# Patient Record
Sex: MALE | Race: WHITE | NOT HISPANIC OR LATINO | URBAN - NONMETROPOLITAN AREA
[De-identification: names, ages, dates, MRNs, and addresses within clinical notes are randomized per-mention and may not be internally consistent; named-entity substitution may affect disease eponyms.]

---

## 2024-03-26 ENCOUNTER — OFFICE VISIT (OUTPATIENT)
Dept: FAMILY MEDICINE | Facility: CLINIC | Age: 34
End: 2024-03-26
Payer: COMMERCIAL

## 2024-03-26 VITALS
HEIGHT: 71 IN | BODY MASS INDEX: 34.02 KG/M2 | SYSTOLIC BLOOD PRESSURE: 130 MMHG | DIASTOLIC BLOOD PRESSURE: 77 MMHG | HEART RATE: 75 BPM | OXYGEN SATURATION: 98 % | TEMPERATURE: 97 F | WEIGHT: 243 LBS

## 2024-03-26 DIAGNOSIS — F11.21 OPIOID DEPENDENCE IN REMISSION: Primary | ICD-10-CM

## 2024-03-26 DIAGNOSIS — F33.9 RECURRENT DEPRESSION: ICD-10-CM

## 2024-03-26 PROCEDURE — 99204 OFFICE O/P NEW MOD 45 MIN: CPT | Mod: ,,, | Performed by: FAMILY MEDICINE

## 2024-03-26 RX ORDER — DULOXETIN HYDROCHLORIDE 30 MG/1
30 CAPSULE, DELAYED RELEASE ORAL DAILY
COMMUNITY
End: 2024-03-26 | Stop reason: SDUPTHER

## 2024-03-26 RX ORDER — DULOXETIN HYDROCHLORIDE 30 MG/1
30 CAPSULE, DELAYED RELEASE ORAL DAILY
Qty: 90 CAPSULE | Refills: 1 | Status: SHIPPED | OUTPATIENT
Start: 2024-03-26 | End: 2024-09-22

## 2024-03-26 RX ORDER — BUPRENORPHINE HYDROCHLORIDE AND NALOXONE HYDROCHLORIDE DIHYDRATE 8; 2 MG/1; MG/1
1 TABLET SUBLINGUAL 2 TIMES DAILY
COMMUNITY
End: 2024-03-26 | Stop reason: SDUPTHER

## 2024-03-26 RX ORDER — BUPRENORPHINE HYDROCHLORIDE AND NALOXONE HYDROCHLORIDE DIHYDRATE 8; 2 MG/1; MG/1
1 TABLET SUBLINGUAL 3 TIMES DAILY
Qty: 90 TABLET | Refills: 0 | Status: SHIPPED | OUTPATIENT
Start: 2024-03-26 | End: 2024-04-30 | Stop reason: SDUPTHER

## 2024-03-26 NOTE — PROGRESS NOTES
SUBJECTIVE:  David Castillo is a 33 y.o. male here for Establish Care (Opioid Dependence)      HPI  Patient is here to establish care for Suboxone treatment.  He has been off and on Suboxone for the last 10 years.  He has been on it consistently for the last 2 years.  He has been going to a clinic in [place] but has been having difficulty with that clinic.  He has been on 16 mg daily but some days feels he needs to take an extra tablet.  He feels fatigue and cravings.  He initially started opioids about 15 years ago just recreationally taking hydrocodone and then went to oxycodone taking about 20 tablets daily.  He is currently working at his present job for over 6 months as some .  Never used any heroin or fentanyl or IV drugs.  Was incarcerated awhile back and while there was started on Cymbalta for depression and it was worked well so he is continued taking this.      Tishas allergies, medications, history, and problem list were updated as appropriate.    Review of Systems   Constitutional:  Negative for activity change, appetite change, fatigue and fever.   HENT:  Negative for congestion, ear pain, hearing loss, sore throat and trouble swallowing.    Eyes:  Negative for photophobia, pain, redness and visual disturbance.   Respiratory:  Negative for cough, chest tightness, shortness of breath and wheezing.    Cardiovascular:  Negative for chest pain, palpitations and leg swelling.   Gastrointestinal:  Negative for abdominal distention, abdominal pain and blood in stool.   Endocrine: Negative for cold intolerance, heat intolerance, polydipsia and polyuria.   Genitourinary:  Negative for difficulty urinating, dysuria and frequency.   Musculoskeletal:  Negative for arthralgias, gait problem, joint swelling and myalgias.   Skin:  Negative for color change, pallor and rash.   Allergic/Immunologic: Negative.    Neurological:  Negative for dizziness, seizures, speech difficulty, weakness and headaches.  "  Hematological:  Negative for adenopathy. Does not bruise/bleed easily.   Psychiatric/Behavioral:  Negative for agitation and confusion.       A comprehensive review of symptoms was completed and negative except as noted above.    No results found for this or any previous visit (from the past 504 hour(s)).    OBJECTIVE:  Vital signs  Vitals:    03/26/24 1509 03/26/24 1513   BP: (!) 140/77 130/77   BP Location: Right arm Right arm   Patient Position: Sitting Sitting   Pulse: 75    Temp: 97 °F (36.1 °C)    TempSrc: Temporal    SpO2: 98%    Weight: 110.2 kg (243 lb)    Height: 5' 10.87" (1.8 m)         Physical Exam  Vitals and nursing note reviewed.   Constitutional:       General: He is not in acute distress.     Appearance: Normal appearance. He is not ill-appearing.   HENT:      Head: Normocephalic and atraumatic.      Right Ear: External ear normal.      Left Ear: External ear normal.      Nose: Nose normal.      Mouth/Throat:      Mouth: Mucous membranes are moist.      Pharynx: Oropharynx is clear.   Eyes:      Extraocular Movements: Extraocular movements intact.      Conjunctiva/sclera: Conjunctivae normal.   Cardiovascular:      Rate and Rhythm: Normal rate and regular rhythm.      Heart sounds: Normal heart sounds. No murmur heard.  Pulmonary:      Effort: Pulmonary effort is normal.      Breath sounds: Normal breath sounds. No wheezing or rhonchi.   Abdominal:      General: Abdomen is flat. There is no distension.      Palpations: Abdomen is soft.      Tenderness: There is no abdominal tenderness.   Musculoskeletal:         General: No swelling or deformity. Normal range of motion.      Cervical back: Normal range of motion and neck supple.   Skin:     General: Skin is warm and dry.      Findings: No bruising or rash.   Neurological:      General: No focal deficit present.      Mental Status: He is alert and oriented to person, place, and time.      Cranial Nerves: No cranial nerve deficit.      Motor: No " weakness.   Psychiatric:         Mood and Affect: Mood normal.         Behavior: Behavior normal.         Thought Content: Thought content normal.          ASSESSMENT/PLAN:  1. Opioid dependence in remission  Continue Suboxone but increase to 24 mg daily.  I do not want him to be taking different doses on different days.  I told him once we have him on a stable dose where he feels good we can then start talking about slowly weaning off    2. Recurrent depression  Continue Cymbalta 30 mg daily    Other orders  -     buprenorphine-naloxone 8-2 (SUBOXONE) 8-2 mg Subl; Place 1 tablet (8 mg total) under the tongue 3 (three) times daily.  Dispense: 90 tablet; Refill: 0  -     DULoxetine (CYMBALTA) 30 MG capsule; Take 1 capsule (30 mg total) by mouth once daily.  Dispense: 90 capsule; Refill: 1         Follow Up:  Follow up in about 1 month (around 4/26/2024).

## 2024-04-30 ENCOUNTER — OFFICE VISIT (OUTPATIENT)
Dept: FAMILY MEDICINE | Facility: CLINIC | Age: 34
End: 2024-04-30
Payer: COMMERCIAL

## 2024-04-30 VITALS
WEIGHT: 246.63 LBS | OXYGEN SATURATION: 99 % | HEIGHT: 71 IN | TEMPERATURE: 98 F | SYSTOLIC BLOOD PRESSURE: 138 MMHG | BODY MASS INDEX: 34.53 KG/M2 | DIASTOLIC BLOOD PRESSURE: 68 MMHG | HEART RATE: 60 BPM

## 2024-04-30 DIAGNOSIS — F11.21 OPIOID DEPENDENCE IN REMISSION: Primary | ICD-10-CM

## 2024-04-30 DIAGNOSIS — F33.9 RECURRENT DEPRESSION: ICD-10-CM

## 2024-04-30 PROCEDURE — 99213 OFFICE O/P EST LOW 20 MIN: CPT | Mod: ,,, | Performed by: FAMILY MEDICINE

## 2024-04-30 RX ORDER — BUPRENORPHINE HYDROCHLORIDE AND NALOXONE HYDROCHLORIDE DIHYDRATE 8; 2 MG/1; MG/1
1 TABLET SUBLINGUAL 3 TIMES DAILY
Qty: 90 TABLET | Refills: 2 | Status: SHIPPED | OUTPATIENT
Start: 2024-04-30 | End: 2024-07-29

## 2024-04-30 NOTE — PROGRESS NOTES
"SUBJECTIVE:  David Castillo is a 33 y.o. male here for Follow-up (1mth f/u Suboxone )      HPI  Patient here for follow-up on buprenorphine therapy for opioid dependence.  He is doing well on 24 mg daily.  He is working.  Tishas allergies, medications, history, and problem list were updated as appropriate.    Review of Systems   New problems to report   No results found for this or any previous visit (from the past 504 hour(s)).    OBJECTIVE:  Vital signs  Vitals:    04/30/24 1350   BP: 138/68   BP Location: Right arm   Pulse: 60   Temp: 97.7 °F (36.5 °C)   TempSrc: Temporal   SpO2: 99%   Weight: 111.9 kg (246 lb 9.6 oz)   Height: 5' 11" (1.803 m)        Physical Exam normal affect.  No signs of sedation or withdrawal    ASSESSMENT/PLAN:  1. Opioid dependence in remission   report was reviewed.  Continue buprenorphine 24 mg daily    2. Recurrent depression  Continue duloxetine    Other orders  -     buprenorphine-naloxone 8-2 (SUBOXONE) 8-2 mg Subl; Place 1 tablet (8 mg total) under the tongue 3 (three) times daily.  Dispense: 90 tablet; Refill: 2         Follow Up:  Follow up in about 3 months (around 7/30/2024).            "

## 2024-06-29 DIAGNOSIS — F33.9 RECURRENT DEPRESSION: Primary | ICD-10-CM

## 2024-07-25 RX ORDER — BUPRENORPHINE HYDROCHLORIDE AND NALOXONE HYDROCHLORIDE DIHYDRATE 8; 2 MG/1; MG/1
1 TABLET SUBLINGUAL 3 TIMES DAILY
Qty: 90 TABLET | Refills: 0 | Status: SHIPPED | OUTPATIENT
Start: 2024-07-25

## 2024-08-07 ENCOUNTER — OFFICE VISIT (OUTPATIENT)
Dept: FAMILY MEDICINE | Facility: CLINIC | Age: 34
End: 2024-08-07
Payer: COMMERCIAL

## 2024-08-07 VITALS
BODY MASS INDEX: 35.59 KG/M2 | WEIGHT: 254.19 LBS | TEMPERATURE: 98 F | DIASTOLIC BLOOD PRESSURE: 84 MMHG | SYSTOLIC BLOOD PRESSURE: 122 MMHG | HEIGHT: 71 IN | HEART RATE: 74 BPM | OXYGEN SATURATION: 98 %

## 2024-08-07 DIAGNOSIS — F33.9 RECURRENT DEPRESSION: ICD-10-CM

## 2024-08-07 DIAGNOSIS — Z00.00 WELLNESS EXAMINATION: ICD-10-CM

## 2024-08-07 DIAGNOSIS — F11.20 UNCOMPLICATED OPIOID DEPENDENCE: Primary | ICD-10-CM

## 2024-08-07 PROCEDURE — 99213 OFFICE O/P EST LOW 20 MIN: CPT | Mod: ,,, | Performed by: FAMILY MEDICINE

## 2024-08-26 RX ORDER — BUPRENORPHINE HYDROCHLORIDE AND NALOXONE HYDROCHLORIDE DIHYDRATE 8; 2 MG/1; MG/1
1 TABLET SUBLINGUAL 3 TIMES DAILY
Qty: 90 TABLET | Refills: 0 | Status: SHIPPED | OUTPATIENT
Start: 2024-08-26

## 2024-09-23 RX ORDER — BUPRENORPHINE HYDROCHLORIDE AND NALOXONE HYDROCHLORIDE DIHYDRATE 8; 2 MG/1; MG/1
1 TABLET SUBLINGUAL 3 TIMES DAILY
Qty: 90 TABLET | Refills: 0 | Status: SHIPPED | OUTPATIENT
Start: 2024-09-23

## 2024-09-23 RX ORDER — DULOXETIN HYDROCHLORIDE 30 MG/1
CAPSULE, DELAYED RELEASE ORAL
Qty: 90 CAPSULE | Refills: 1 | Status: SHIPPED | OUTPATIENT
Start: 2024-09-23

## 2024-10-24 ENCOUNTER — TELEPHONE (OUTPATIENT)
Dept: FAMILY MEDICINE | Facility: CLINIC | Age: 34
End: 2024-10-24
Payer: COMMERCIAL

## 2024-10-24 RX ORDER — BUPRENORPHINE HYDROCHLORIDE AND NALOXONE HYDROCHLORIDE DIHYDRATE 8; 2 MG/1; MG/1
1 TABLET SUBLINGUAL 3 TIMES DAILY
Qty: 90 TABLET | Refills: 0 | Status: SHIPPED | OUTPATIENT
Start: 2024-10-24

## 2024-11-12 ENCOUNTER — OFFICE VISIT (OUTPATIENT)
Dept: FAMILY MEDICINE | Facility: CLINIC | Age: 34
End: 2024-11-12
Payer: COMMERCIAL

## 2024-11-12 VITALS
DIASTOLIC BLOOD PRESSURE: 76 MMHG | SYSTOLIC BLOOD PRESSURE: 134 MMHG | OXYGEN SATURATION: 100 % | WEIGHT: 263.63 LBS | TEMPERATURE: 98 F | BODY MASS INDEX: 36.91 KG/M2 | HEART RATE: 59 BPM | HEIGHT: 71 IN

## 2024-11-12 DIAGNOSIS — F33.9 RECURRENT DEPRESSION: ICD-10-CM

## 2024-11-12 DIAGNOSIS — F11.20 UNCOMPLICATED OPIOID DEPENDENCE: Primary | ICD-10-CM

## 2024-11-12 DIAGNOSIS — M50.90 CERVICAL DISC DISEASE: ICD-10-CM

## 2024-11-12 PROCEDURE — 99214 OFFICE O/P EST MOD 30 MIN: CPT | Mod: ,,, | Performed by: FAMILY MEDICINE

## 2024-11-12 RX ORDER — NAPROXEN 500 MG/1
500 TABLET ORAL 2 TIMES DAILY
Qty: 60 TABLET | Refills: 0 | Status: SHIPPED | OUTPATIENT
Start: 2024-11-12 | End: 2024-12-12

## 2024-11-12 RX ORDER — PREDNISONE 20 MG/1
40 TABLET ORAL DAILY
Qty: 14 TABLET | Refills: 0 | Status: SHIPPED | OUTPATIENT
Start: 2024-11-12 | End: 2024-11-19

## 2024-11-12 NOTE — PROGRESS NOTES
SUBJECTIVE:  David Castillo is a 34 y.o. male here for 3 [facility]      HPI  Patient here for follow-up on opioid dependence.  He is doing well with Suboxone.  Recently he went to urgent care having some left shoulder blade pain.  They gave him a Toradol shot and some prednisone in his really helped his pain while he was on it but the pain returned when it came back.  In his worse when he takes a deep breath and when he lifts his arm down.  The pain has improved and he holds his arm over his head.  David's allergies, medications, history, and problem list were updated as appropriate.    Review of Systems   See HPI  Recent Results (from the past 3 weeks)   Hemoglobin A1C    Collection Time: 11/01/24  8:46 AM   Result Value Ref Range    Hemoglobin A1c 5.5 4.8 - 5.6 %   TSH    Collection Time: 11/01/24  8:46 AM   Result Value Ref Range    TSH 2.450 0.450 - 4.500 uIU/mL   Lipid Panel    Collection Time: 11/01/24  8:46 AM   Result Value Ref Range    Cholesterol 219 (H) 100 - 199 mg/dL    Triglycerides 193 (H) 0 - 149 mg/dL    HDL 36 (L) >39 mg/dL    VLDL Cholesterol Haim 35 5 - 40 mg/dL    LDL Calculated 148 (H) 0 - 99 mg/dL   Comprehensive Metabolic Panel    Collection Time: 11/01/24  8:46 AM   Result Value Ref Range    Glucose 98 70 - 99 mg/dL    BUN 13 6 - 20 mg/dL    Creatinine 1.18 0.76 - 1.27 mg/dL    eGFR 84 >59 mL/min/1.73    BUN/Creatinine Ratio 11 9 - 20    Sodium 142 134 - 144 mmol/L    Potassium 4.6 3.5 - 5.2 mmol/L    Chloride 101 96 - 106 mmol/L    CO2 26 20 - 29 mmol/L    Calcium 9.7 8.7 - 10.2 mg/dL    Protein, Total 7.1 6.0 - 8.5 g/dL    Albumin 4.6 4.1 - 5.1 g/dL    Globulin, Total 2.5 1.5 - 4.5 g/dL    Total Bilirubin 0.3 0.0 - 1.2 mg/dL    Alkaline Phosphatase 92 44 - 121 IU/L    AST 25 0 - 40 IU/L    ALT 44 0 - 44 IU/L   CBC Auto Differential    Collection Time: 11/01/24  8:46 AM   Result Value Ref Range    WBC 6.9 3.4 - 10.8 x10E3/uL    RBC 4.92 4.14 - 5.80 x10E6/uL    Hemoglobin 14.6  "13.0 - 17.7 g/dL    Hematocrit 43.5 37.5 - 51.0 %    MCV 88 79 - 97 fL    MCH 29.7 26.6 - 33.0 pg    MCHC 33.6 31.5 - 35.7 g/dL    RDW 12.8 11.6 - 15.4 %    Platelets 247 150 - 450 x10E3/uL    Neutrophils 53 Not Estab. %    Lymphs 34 Not Estab. %    Monocytes 9 Not Estab. %    Eos 2 Not Estab. %    Basos 1 Not Estab. %    Neutrophils (Absolute) 3.7 1.4 - 7.0 x10E3/uL    Lymphs (Absolute) 2.4 0.7 - 3.1 x10E3/uL    Monocytes(Absolute) 0.6 0.1 - 0.9 x10E3/uL    Eos (Absolute) 0.1 0.0 - 0.4 x10E3/uL    Baso (Absolute) 0.0 0.0 - 0.2 x10E3/uL    Immature Granulocytes 1 Not Estab. %       OBJECTIVE:  Vital signs  Vitals:    11/12/24 1624   BP: 134/76   BP Location: Right arm   Patient Position: Sitting   Pulse: (!) 59   Temp: 98 °F (36.7 °C)   TempSrc: Oral   SpO2: 100%   Weight: 119.6 kg (263 lb 9.6 oz)   Height: 5' 10.87" (1.8 m)        Physical Exam exam of the neck shows some pain reproducible with neck extension and lateral rotation in either direction    ASSESSMENT/PLAN:  1. Uncomplicated opioid dependence  Continue Suboxone 24 mg daily    2. Recurrent depression  Continue duloxetine.  Doing well with this    3. Cervical disc disease  Symptoms sound like cervical disc disease.  We will do prednisone 40 mg a day for 7 days and start naproxen 500 mg twice a day for the next month.  Other orders  -     predniSONE (DELTASONE) 20 MG tablet; Take 2 tablets (40 mg total) by mouth once daily. for 7 days  Dispense: 14 tablet; Refill: 0  -     naproxen (NAPROSYN) 500 MG tablet; Take 1 tablet (500 mg total) by mouth 2 (two) times daily.  Dispense: 60 tablet; Refill: 0         Follow Up:  Follow up in about 3 months (around 2/12/2025).            "

## 2024-11-21 DIAGNOSIS — F11.20 UNCOMPLICATED OPIOID DEPENDENCE: Primary | ICD-10-CM

## 2024-11-22 RX ORDER — BUPRENORPHINE HYDROCHLORIDE AND NALOXONE HYDROCHLORIDE DIHYDRATE 8; 2 MG/1; MG/1
1 TABLET SUBLINGUAL 3 TIMES DAILY
Qty: 90 TABLET | Refills: 1 | Status: SHIPPED | OUTPATIENT
Start: 2024-11-22

## 2025-01-26 DIAGNOSIS — F11.20 UNCOMPLICATED OPIOID DEPENDENCE: ICD-10-CM

## 2025-01-27 RX ORDER — BUPRENORPHINE HYDROCHLORIDE AND NALOXONE HYDROCHLORIDE DIHYDRATE 8; 2 MG/1; MG/1
1 TABLET SUBLINGUAL 3 TIMES DAILY
Qty: 90 TABLET | Refills: 0 | Status: SHIPPED | OUTPATIENT
Start: 2025-01-27

## 2025-02-18 ENCOUNTER — OFFICE VISIT (OUTPATIENT)
Dept: FAMILY MEDICINE | Facility: CLINIC | Age: 35
End: 2025-02-18
Payer: COMMERCIAL

## 2025-02-18 VITALS
HEART RATE: 71 BPM | OXYGEN SATURATION: 98 % | WEIGHT: 238.63 LBS | TEMPERATURE: 98 F | DIASTOLIC BLOOD PRESSURE: 90 MMHG | BODY MASS INDEX: 33.41 KG/M2 | HEIGHT: 71 IN | SYSTOLIC BLOOD PRESSURE: 142 MMHG

## 2025-02-18 DIAGNOSIS — F33.9 RECURRENT DEPRESSION: ICD-10-CM

## 2025-02-18 DIAGNOSIS — F11.20 UNCOMPLICATED OPIOID DEPENDENCE: Primary | ICD-10-CM

## 2025-02-18 RX ORDER — BUPRENORPHINE HYDROCHLORIDE AND NALOXONE HYDROCHLORIDE DIHYDRATE 8; 2 MG/1; MG/1
1 TABLET SUBLINGUAL 3 TIMES DAILY
Qty: 90 TABLET | Refills: 0 | Status: SHIPPED | OUTPATIENT
Start: 2025-02-27 | End: 2025-05-28

## 2025-02-18 RX ORDER — DULOXETIN HYDROCHLORIDE 30 MG/1
30 CAPSULE, DELAYED RELEASE ORAL DAILY
Qty: 90 CAPSULE | Refills: 1 | Status: SHIPPED | OUTPATIENT
Start: 2025-02-18 | End: 2025-08-17

## 2025-02-18 NOTE — PROGRESS NOTES
"SUBJECTIVE:  David Castillo is a 34 y.o. male here for Follow-up ([facility])      HPI  Patient being seen for follow-up on opioid dependence.  Doing very well with Suboxone 24 mg daily.  He is working.  Tishas allergies, medications, history, and problem list were updated as appropriate.    Review of Systems   See [facility]    No results found for this or any previous visit (from the past 3 weeks).    OBJECTIVE:  Vital signs  Vitals:    02/18/25 1628 02/18/25 1649   BP: (!) 154/98 (!) 142/90   BP Location: Left arm Left arm   Patient Position: Sitting Sitting   Pulse: 71    Temp: 98.2 °F (36.8 °C)    TempSrc: Oral    SpO2: 98%    Weight: 108.2 kg (238 lb 9.6 oz)    Height: 5' 10.87" (1.8 m)         Physical Exam normal affect, no signs of sedation or withdrawal    ASSESSMENT/PLAN:  1. Uncomplicated opioid dependence  Continue Suboxone 8 mg 3 times a day.   report was reviewed  -     buprenorphine-naloxone 8-2 (SUBOXONE) 8-2 mg Subl; Place 1 tablet (8 mg total) under the tongue 3 (three) times daily. Dissolve under the tongue  Dispense: 90 tablet; Refill: 0    2. Recurrent depression  With duloxetine  -     DULoxetine (CYMBALTA) 30 MG capsule; Take 1 capsule (30 mg total) by mouth once daily.  Dispense: 90 capsule; Refill: 1     Blood pressure little elevated today.  We will be monitoring this.  Previous readings were all good    Follow Up:  Follow up in about 3 months (around 5/18/2025).            "

## 2025-03-23 DIAGNOSIS — F11.20 UNCOMPLICATED OPIOID DEPENDENCE: ICD-10-CM

## 2025-03-24 RX ORDER — BUPRENORPHINE HYDROCHLORIDE AND NALOXONE HYDROCHLORIDE DIHYDRATE 8; 2 MG/1; MG/1
1 TABLET SUBLINGUAL 3 TIMES DAILY
Qty: 90 TABLET | Refills: 1 | Status: SHIPPED | OUTPATIENT
Start: 2025-03-24

## 2025-05-22 ENCOUNTER — OFFICE VISIT (OUTPATIENT)
Dept: FAMILY MEDICINE | Facility: CLINIC | Age: 35
End: 2025-05-22
Payer: COMMERCIAL

## 2025-05-22 VITALS
WEIGHT: 216.38 LBS | DIASTOLIC BLOOD PRESSURE: 80 MMHG | BODY MASS INDEX: 30.29 KG/M2 | OXYGEN SATURATION: 100 % | TEMPERATURE: 99 F | HEART RATE: 63 BPM | HEIGHT: 71 IN | SYSTOLIC BLOOD PRESSURE: 138 MMHG

## 2025-05-22 DIAGNOSIS — F11.20 UNCOMPLICATED OPIOID DEPENDENCE: Primary | ICD-10-CM

## 2025-05-22 DIAGNOSIS — F33.9 RECURRENT DEPRESSION: ICD-10-CM

## 2025-05-22 PROCEDURE — 3075F SYST BP GE 130 - 139MM HG: CPT | Mod: CPTII,,, | Performed by: FAMILY MEDICINE

## 2025-05-22 PROCEDURE — 99213 OFFICE O/P EST LOW 20 MIN: CPT | Mod: ,,, | Performed by: FAMILY MEDICINE

## 2025-05-22 PROCEDURE — 3008F BODY MASS INDEX DOCD: CPT | Mod: CPTII,,, | Performed by: FAMILY MEDICINE

## 2025-05-22 PROCEDURE — 1159F MED LIST DOCD IN RCRD: CPT | Mod: CPTII,,, | Performed by: FAMILY MEDICINE

## 2025-05-22 PROCEDURE — 3079F DIAST BP 80-89 MM HG: CPT | Mod: CPTII,,, | Performed by: FAMILY MEDICINE

## 2025-05-22 RX ORDER — DULOXETIN HYDROCHLORIDE 30 MG/1
30 CAPSULE, DELAYED RELEASE ORAL DAILY
Qty: 90 CAPSULE | Refills: 1 | Status: SHIPPED | OUTPATIENT
Start: 2025-05-22 | End: 2025-11-18

## 2025-05-22 RX ORDER — BUPRENORPHINE HYDROCHLORIDE AND NALOXONE HYDROCHLORIDE DIHYDRATE 8; 2 MG/1; MG/1
1 TABLET SUBLINGUAL 3 TIMES DAILY
Qty: 90 TABLET | Refills: 2 | Status: SHIPPED | OUTPATIENT
Start: 2025-05-22

## 2025-05-22 NOTE — PROGRESS NOTES
"SUBJECTIVE:  David Castillo is a 34 y.o. male here for Medication Management      HPI  Patient here for follow-up on opioid dependence treatment.  Doing very well on Suboxone 24 mg daily.  He is working full-time.  Depression also doing well with Ruchi  David's allergies, medications, history, and problem list were updated as appropriate.    Review of Systems   No new problems to report    No results found for this or any previous visit (from the past 3 weeks).    OBJECTIVE:  Vital signs  Vitals:    05/22/25 1629   BP: 138/80   BP Location: Right arm   Patient Position: Sitting   Pulse: 63   Temp: 98.9 °F (37.2 °C)   TempSrc: Oral   SpO2: 100%   Weight: 98.2 kg (216 lb 6.4 oz)   Height: 5' 10.87" (1.8 m)        Physical Exam normal affect and speech    ASSESSMENT/PLAN:  1. Uncomplicated opioid dependence   report reviewed.  Continue Suboxone 24 mg daily  -     buprenorphine-naloxone 8-2 (SUBOXONE) 8-2 mg Subl; Place 1 tablet (8 mg total) under the tongue 3 (three) times daily. Dissolve under the tongue  Dispense: 90 tablet; Refill: 2    2. Recurrent depression  Stable on duloxetine  -     DULoxetine (CYMBALTA) 30 MG capsule; Take 1 capsule (30 mg total) by mouth once daily.  Dispense: 90 capsule; Refill: 1         Follow Up:  Follow up in about 3 months (around 8/22/2025).            "

## 2025-08-20 ENCOUNTER — OFFICE VISIT (OUTPATIENT)
Dept: FAMILY MEDICINE | Facility: CLINIC | Age: 35
End: 2025-08-20
Payer: COMMERCIAL

## 2025-08-20 VITALS
DIASTOLIC BLOOD PRESSURE: 72 MMHG | TEMPERATURE: 97 F | SYSTOLIC BLOOD PRESSURE: 124 MMHG | OXYGEN SATURATION: 100 % | HEIGHT: 71 IN | HEART RATE: 59 BPM | BODY MASS INDEX: 29.71 KG/M2 | WEIGHT: 212.19 LBS

## 2025-08-20 DIAGNOSIS — F33.9 RECURRENT DEPRESSION: ICD-10-CM

## 2025-08-20 DIAGNOSIS — F11.20 UNCOMPLICATED OPIOID DEPENDENCE: Primary | ICD-10-CM

## 2025-08-20 PROCEDURE — 3074F SYST BP LT 130 MM HG: CPT | Mod: CPTII,,, | Performed by: FAMILY MEDICINE

## 2025-08-20 PROCEDURE — 3078F DIAST BP <80 MM HG: CPT | Mod: CPTII,,, | Performed by: FAMILY MEDICINE

## 2025-08-20 PROCEDURE — 1159F MED LIST DOCD IN RCRD: CPT | Mod: CPTII,,, | Performed by: FAMILY MEDICINE

## 2025-08-20 PROCEDURE — 3008F BODY MASS INDEX DOCD: CPT | Mod: CPTII,,, | Performed by: FAMILY MEDICINE

## 2025-08-20 PROCEDURE — 99213 OFFICE O/P EST LOW 20 MIN: CPT | Mod: ,,, | Performed by: FAMILY MEDICINE

## 2025-08-20 RX ORDER — BUPRENORPHINE HYDROCHLORIDE AND NALOXONE HYDROCHLORIDE DIHYDRATE 8; 2 MG/1; MG/1
1 TABLET SUBLINGUAL 3 TIMES DAILY
Qty: 90 TABLET | Refills: 2 | Status: SHIPPED | OUTPATIENT
Start: 2025-08-20